# Patient Record
Sex: MALE | Race: WHITE
[De-identification: names, ages, dates, MRNs, and addresses within clinical notes are randomized per-mention and may not be internally consistent; named-entity substitution may affect disease eponyms.]

---

## 2019-12-15 ENCOUNTER — HOSPITAL ENCOUNTER (INPATIENT)
Dept: HOSPITAL 96 - M.ERS | Age: 24
LOS: 1 days | Discharge: LEFT BEFORE DISCHARGE | DRG: 101 | End: 2019-12-16
Attending: INTERNAL MEDICINE | Admitting: INTERNAL MEDICINE
Payer: COMMERCIAL

## 2019-12-15 VITALS — SYSTOLIC BLOOD PRESSURE: 125 MMHG | DIASTOLIC BLOOD PRESSURE: 69 MMHG

## 2019-12-15 VITALS — BODY MASS INDEX: 38.53 KG/M2 | HEIGHT: 69.02 IN | WEIGHT: 260.15 LBS

## 2019-12-15 DIAGNOSIS — F17.290: ICD-10-CM

## 2019-12-15 DIAGNOSIS — Z53.29: ICD-10-CM

## 2019-12-15 DIAGNOSIS — F12.90: ICD-10-CM

## 2019-12-15 DIAGNOSIS — Z28.21: ICD-10-CM

## 2019-12-15 DIAGNOSIS — G40.909: Primary | ICD-10-CM

## 2019-12-15 DIAGNOSIS — D69.2: ICD-10-CM

## 2019-12-15 DIAGNOSIS — E87.2: ICD-10-CM

## 2019-12-15 DIAGNOSIS — D72.0: ICD-10-CM

## 2019-12-15 DIAGNOSIS — Z79.899: ICD-10-CM

## 2019-12-15 LAB
ABSOLUTE BASOPHILS: 0.1 THOU/UL (ref 0–0.2)
ABSOLUTE EOSINOPHILS: 0.1 THOU/UL (ref 0–0.7)
ABSOLUTE MONOCYTES: 0.8 THOU/UL (ref 0–1.2)
ALBUMIN SERPL-MCNC: 4 G/DL (ref 3.4–5)
ALP SERPL-CCNC: 131 U/L (ref 46–116)
ALT SERPL-CCNC: 41 U/L (ref 30–65)
ANION GAP SERPL CALC-SCNC: 17 MMOL/L (ref 7–16)
APTT BLD: 26.4 SECONDS (ref 25–31.3)
AST SERPL-CCNC: 21 U/L (ref 15–37)
BASOPHILS NFR BLD AUTO: 0.4 %
BE: -4 MMOL/L
BILIRUB SERPL-MCNC: 0.2 MG/DL
BUN SERPL-MCNC: 10 MG/DL (ref 7–18)
CALCIUM SERPL-MCNC: 9.4 MG/DL (ref 8.5–10.1)
CHLORIDE SERPL-SCNC: 100 MMOL/L (ref 98–107)
CO2 SERPL-SCNC: 17 MMOL/L (ref 21–32)
CREAT SERPL-MCNC: 1.2 MG/DL (ref 0.6–1.3)
EOSINOPHIL NFR BLD: 1 %
GLUCOSE SERPL-MCNC: 100 MG/DL (ref 70–99)
GRANULOCYTES NFR BLD MANUAL: 70.8 %
HCT VFR BLD CALC: 45 % (ref 42–52)
HGB BLD-MCNC: 15.2 GM/DL (ref 14–18)
INR PPP: 1
LYMPHOCYTES # BLD: 3 THOU/UL (ref 0.8–5.3)
LYMPHOCYTES NFR BLD AUTO: 21.9 %
MAGNESIUM SERPL-MCNC: 2.9 MG/DL (ref 1.8–2.4)
MCH RBC QN AUTO: 29.2 PG (ref 26–34)
MCHC RBC AUTO-ENTMCNC: 33.8 G/DL (ref 28–37)
MCV RBC: 86.5 FL (ref 80–100)
MONOCYTES NFR BLD: 5.9 %
MPV: 8.4 FL. (ref 7.2–11.1)
NEUTROPHILS # BLD: 9.8 THOU/UL (ref 1.6–8.1)
NUCLEATED RBCS: 0 /100WBC
PCO2 BLD: 29.1 MMHG (ref 35–45)
PLATELET COUNT*: 231 THOU/UL (ref 150–400)
PO2 BLD: 64.5 MMHG (ref 75–100)
POTASSIUM SERPL-SCNC: 3.7 MMOL/L (ref 3.5–5.1)
PROT SERPL-MCNC: 7.6 G/DL (ref 6.4–8.2)
PROTHROMBIN TIME: 10.7 SECONDS (ref 9.2–11.5)
RBC # BLD AUTO: 5.21 MIL/UL (ref 4.5–6)
RDW-CV: 14 % (ref 10.5–14.5)
SODIUM SERPL-SCNC: 134 MMOL/L (ref 136–145)
WBC # BLD AUTO: 13.8 THOU/UL (ref 4–11)

## 2019-12-15 NOTE — CON
[address]  [address]                    CONSULTATION                  
_______________________________________________________________________________
 
Name:       YAZMIN FLORES               Room:           [address] [place]  
M.R.#:  [id number]      Account #:      [id number]  
Admission:  12/16/19     Attend Phys:    JONNATHAN Granger
Discharge:  12/16/19     Date of Birth:  [birth year]  
         Report #: [id number]
                                                                     [id number]  
_______________________________________________________________________________
THIS REPORT FOR:  //name//                      
 
CC: [facility] physician/PCP
    Balaji Russell
 
DATE OF SERVICE:  12/16/2019
 
 
HISTORY OF PRESENT ILLNESS:  This is a 24-year-old male patient who was seen by
me for seizure.  The patient apparently had a grand mal seizure yesterday.  He
has a history of marijuana abuse in vaping.  He had a seizure little more than 1
year ago and he was put on Keppra.  He said he takes his Keppra on a regular
basis but misses doses some time.
 
He had a history of seizure as a child but that appeared to be febrile seizure. 
His Keppra dose was recently cut back to 500 b.i.d.
 
REVIEW OF SYSTEMS:  Indicate some petechial eruptions, it is not clear what the
etiology in that regard is.  His magnesium was high at 2.9.  He is set up for
sleep apnea, but does not look like he has any sleep apnea documentation.  A
14-point review of system was carried out, but was otherwise unremarkable.
 
PAST MEDICAL HISTORY:  Positive for seizure.
 
FAMILY HISTORY:  Unremarkable.
 
SOCIAL HISTORY:  He does not abuse alcohol, but does smoke marijuana all the
time.
 
PHYSICAL EXAMINATION:  Indicate he is alert, responsive, able to follow simple
and complex command.  His speech, concentration, fund of knowledge and memory is
at his baseline.  His cranial nerve examination appears mostly unremarkable. 
His strength, sensation, reflexes and tone is symmetrical.  There is no
meningeal sign.  I could not look at the patient's fundus.  His cardiac and
respiratory examinations appear unremarkable.  Blood pressure is 127/78,
respiration is 18, pulse is 70, temperature is 98.1.
 
LABORATORY DATA:  His white count is 14.4, which is high.  
 
IMAGING:  He did have a CT scan of the head, which does not show any clearcut
abnormality.  His MRI is pending.
 
IMPRESSION:  Breakthrough seizure.  
 
PLAN:  We will increase his dose of Keppra.  I will get an MRI done.  I
discussed with them seizure precautions he needs to take.  Those seizure
 
 
 
[place] [address]                    CONSULTATION                  
_______________________________________________________________________________
 
Name:       ANGELAMITESHBIJALYAZMIN               Room:           [address] [place]  
[facility]#:  [id number]      Account #:      [id number]  
Admission:  12/16/19     Attend Phys:    JONNATHAN Granger
Discharge:  12/16/19     Date of Birth:  [birth year]  
         Report #: [id number]
                                                                     [id number]  
_______________________________________________________________________________
precautions include the fact that he cannot drive at least for 6 months and
other precautions he needs to take and he understands.
 
Thank you very much for this referral and we will follow this patient with you.
 
 
 
 
 
 
 
 
 
 
 
 
 
 
 
 
 
 
 
 
 
 
 
 
 
 
 
 
 
 
 
 
 
 
 
 
 
 
 
 
                       
                                        By:                                
                 
D: 12/16/19 1453_______________________________________
T: 12/16/19 2233Pvenkata Stone MD            /nt

## 2019-12-16 VITALS — DIASTOLIC BLOOD PRESSURE: 64 MMHG | SYSTOLIC BLOOD PRESSURE: 120 MMHG

## 2019-12-16 VITALS — SYSTOLIC BLOOD PRESSURE: 127 MMHG | DIASTOLIC BLOOD PRESSURE: 78 MMHG

## 2019-12-16 VITALS — SYSTOLIC BLOOD PRESSURE: 123 MMHG | DIASTOLIC BLOOD PRESSURE: 60 MMHG

## 2019-12-16 VITALS — SYSTOLIC BLOOD PRESSURE: 100 MMHG | DIASTOLIC BLOOD PRESSURE: 51 MMHG

## 2019-12-16 VITALS — SYSTOLIC BLOOD PRESSURE: 114 MMHG | DIASTOLIC BLOOD PRESSURE: 61 MMHG

## 2019-12-16 VITALS — DIASTOLIC BLOOD PRESSURE: 48 MMHG | SYSTOLIC BLOOD PRESSURE: 104 MMHG

## 2019-12-16 VITALS — SYSTOLIC BLOOD PRESSURE: 111 MMHG | DIASTOLIC BLOOD PRESSURE: 59 MMHG

## 2019-12-16 VITALS — SYSTOLIC BLOOD PRESSURE: 107 MMHG | DIASTOLIC BLOOD PRESSURE: 48 MMHG

## 2019-12-16 VITALS — SYSTOLIC BLOOD PRESSURE: 121 MMHG | DIASTOLIC BLOOD PRESSURE: 69 MMHG

## 2019-12-16 VITALS — DIASTOLIC BLOOD PRESSURE: 50 MMHG | SYSTOLIC BLOOD PRESSURE: 95 MMHG

## 2019-12-16 VITALS — DIASTOLIC BLOOD PRESSURE: 59 MMHG | SYSTOLIC BLOOD PRESSURE: 111 MMHG

## 2019-12-16 VITALS — DIASTOLIC BLOOD PRESSURE: 58 MMHG | SYSTOLIC BLOOD PRESSURE: 114 MMHG

## 2019-12-16 VITALS — DIASTOLIC BLOOD PRESSURE: 37 MMHG | SYSTOLIC BLOOD PRESSURE: 86 MMHG

## 2019-12-16 LAB
ABSOLUTE MONOCYTES: 0.1 THOU/UL (ref 0–1.2)
ANION GAP SERPL CALC-SCNC: 13 MMOL/L (ref 7–16)
ANISOCYTOSIS BLD QL SMEAR: (no result)
BILIRUB UR-MCNC: NEGATIVE MG/DL
BUN SERPL-MCNC: 8 MG/DL (ref 7–18)
CALCIUM SERPL-MCNC: 9.3 MG/DL (ref 8.5–10.1)
CHLORIDE SERPL-SCNC: 106 MMOL/L (ref 98–107)
CO2 SERPL-SCNC: 22 MMOL/L (ref 21–32)
COLOR UR: YELLOW
CREAT SERPL-MCNC: 1 MG/DL (ref 0.6–1.3)
GLUCOSE SERPL-MCNC: 117 MG/DL (ref 70–99)
GRANULOCYTES NFR BLD MANUAL: 92 %
HCT VFR BLD CALC: 42.9 % (ref 42–52)
HGB BLD-MCNC: 14.6 GM/DL (ref 14–18)
KETONES UR STRIP-MCNC: NEGATIVE MG/DL
LYMPHOCYTES # BLD: 1 THOU/UL (ref 0.8–5.3)
LYMPHOCYTES NFR BLD AUTO: 7 %
MCH RBC QN AUTO: 29.2 PG (ref 26–34)
MCHC RBC AUTO-ENTMCNC: 34 G/DL (ref 28–37)
MCV RBC: 86 FL (ref 80–100)
MONOCYTES NFR BLD: 1 %
MPV: 8.5 FL. (ref 7.2–11.1)
NEUTROPHILS # BLD: 13.2 THOU/UL (ref 1.6–8.1)
NUCLEATED RBCS: 0 /100WBC
PLATELET # BLD EST: ADEQUATE 10*3/UL
PLATELET COUNT*: 242 THOU/UL (ref 150–400)
POIKILOCYTOSIS BLD QL SMEAR: (no result)
POTASSIUM SERPL-SCNC: 4.3 MMOL/L (ref 3.5–5.1)
PROT UR QL STRIP: (no result)
RBC # BLD AUTO: 4.99 MIL/UL (ref 4.5–6)
RBC # UR STRIP: (no result) /UL
RDW-CV: 14.2 % (ref 10.5–14.5)
SODIUM SERPL-SCNC: 141 MMOL/L (ref 136–145)
SP GR UR STRIP: >= 1.03 (ref 1–1.03)
THC: POSITIVE
URINE CLARITY: CLEAR
URINE GLUCOSE-RANDOM: NEGATIVE
URINE LEUKOCYTES-REFLEX: NEGATIVE
URINE NITRITE-REFLEX: NEGATIVE
UROBILINOGEN UR STRIP-ACNC: 0.2 E.U./DL (ref 0.2–1)
WBC # BLD AUTO: 14.4 THOU/UL (ref 4–11)

## 2019-12-16 NOTE — EKG
[place] [address]
Phone:  [phone]                     ELECTROCARDIOGRAM REPORT      
_______________________________________________________________________________
 
Name:       YAZMIN FLORES               Room:           [address]    [place]  
M.R.#:  [id number]      Account #:      [id number]  
Admission:  19     Attend Phys:    JONNATHAN Granger
Discharge:               Date of Birth:  [birth year]  
         Report #: [id number]
    [id number]
_______________________________________________________________________________
THIS REPORT FOR:  //name//                      
 
                         [facility] ED
                                       
Test Date:    2019-12-15               Test Time:    22:25:06
Pat Name:     YAZMIN FLORES           Department:   
Patient ID:   [id number]            Room:         [address]
Gender:       M                        Technician:   EDGAR
:          1995               Requested By: Balaji Russell
Order Number: [id number]    Meagan MD:   Riki Floyd
                                 Measurements
Intervals                              Axis          
Rate:         102                      P:            44
NM:           178                      QRS:          -8
QRSD:         92                       T:            15
QT:           347                                    
QTc:          453                                    
                           Interpretive Statements
Sinus tachycardia
No previous ECG available for comparison
 
Electronically Signed On 2019 10:53:46 CST by Riki Floyd
[email or web address]
 
 
 
 
 
 
 
 
 
 
 
 
 
 
 
 
 
 
 
 
  <ELECTRONICALLY SIGNED>
                                           By: Riki Floyd MD, [facility]      
  19     1053
D: 12/15/19 2225   _____________________________________
T: 12/15/19 2225   Riki Floyd MD, FACC        /EPI

## 2019-12-16 NOTE — NUR
[place]OUN[place]: MET WITH PT, HE LIVES WITH HIS PARENTS.  PT IS UNEMPLOYED. HAS
SEIZURE HX AND STATES HIS PARENTS ASSIST WITH PAYING FOR HIS MONTHLY KEPPRA
SCRIPT. PT PLANS TO RETURN HOME AT [place], IS INDEPENDENT.  GAVE COMMUNITY
RESOURCES INCLUDIN SAFETY NET CLINICS AND SUGGESTED TRY TO F/U AT [facility] IN [place]

## 2019-12-16 NOTE — NUR
NO SEIZURE ACTIVITY DURING THE WHOLE DAY. SEIZURE PRECAUTIONS IN PLACE.
PT RECIEVED FROM MRI AT 1855. VSS.

## 2019-12-16 NOTE — NUR
vitals stable, afebrile. no seizure activities since arrival to icu at 0240
this am. pt a$ox4, denies pain/discomfort. seizure precautions in place,
spo2>90% on RA. call light within reach. will continue monitoring.

## 2019-12-16 NOTE — NUR
PT. LEFT AMA AT THIS TIME.  DR. CABRERA NOTIFIED, ACCOMPANIED OUTSIDE BY THIS
RN.  PT. STATES HE HAS NO INSURANCE THEREFOR CANNOT AFFORD TO SPEND THE NIGHT
IN THE HOSPITAL. DR. KRISHNAMURTHY PAGED, AWAITING CALL BACK.  PT. DC'D AMA AT THIS
TIME.